# Patient Record
Sex: MALE
[De-identification: names, ages, dates, MRNs, and addresses within clinical notes are randomized per-mention and may not be internally consistent; named-entity substitution may affect disease eponyms.]

---

## 2021-06-17 ENCOUNTER — TRANSCRIPTION ENCOUNTER (OUTPATIENT)
Age: 56
End: 2021-06-17

## 2022-03-09 ENCOUNTER — NON-APPOINTMENT (OUTPATIENT)
Age: 57
End: 2022-03-09

## 2022-03-09 ENCOUNTER — APPOINTMENT (OUTPATIENT)
Dept: ORTHOPEDIC SURGERY | Facility: CLINIC | Age: 57
End: 2022-03-09
Payer: COMMERCIAL

## 2022-03-09 VITALS
HEIGHT: 72 IN | DIASTOLIC BLOOD PRESSURE: 86 MMHG | BODY MASS INDEX: 31.42 KG/M2 | WEIGHT: 232 LBS | HEART RATE: 76 BPM | SYSTOLIC BLOOD PRESSURE: 146 MMHG

## 2022-03-09 PROBLEM — Z00.00 ENCOUNTER FOR PREVENTIVE HEALTH EXAMINATION: Status: ACTIVE | Noted: 2022-03-09

## 2022-03-09 PROCEDURE — 99204 OFFICE O/P NEW MOD 45 MIN: CPT

## 2022-03-09 PROCEDURE — 73140 X-RAY EXAM OF FINGER(S): CPT

## 2022-03-18 NOTE — HISTORY OF PRESENT ILLNESS
[FreeTextEntry1] : 56 year male presents for evaluation of left middle finger swelling. He reports a history of an injury to the left hand about 2 years ago. He notes a fishing hook caught into the left hand at the level of 5th MCP joint. He reports healing well from the puncture wound with no obvious sign of infection at that time. He notes about a month later, he developed swelling of the left ring finger, in which he was evaluated with xray and MRI revealing no acute sign of infection or cause for the swelling. He then notes about one month later, the swelling of the RF resolved and he developed extensive generalized swelling of the left MF, most severe at the level of the PIP joint. He denies further treatment or evaluation. He reports no resolution with NSAIDs. He reports mild stiffness of the finger, with progressive inability to make a fist. He presents for other options for treatment.

## 2022-03-18 NOTE — ASSESSMENT
[FreeTextEntry1] : 56M with chronic swelling stiffness and pain of the left middle finger PIP joint following an infection.  I recommend MRI to evaluate for osteomyelitis of the proximal phalanx.  f/u to review the results of the MRI.

## 2022-03-18 NOTE — PHYSICAL EXAM
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

## 2022-03-21 ENCOUNTER — OUTPATIENT (OUTPATIENT)
Dept: OUTPATIENT SERVICES | Facility: HOSPITAL | Age: 57
LOS: 1 days | End: 2022-03-21
Payer: COMMERCIAL

## 2022-03-21 ENCOUNTER — APPOINTMENT (OUTPATIENT)
Dept: MRI IMAGING | Facility: CLINIC | Age: 57
End: 2022-03-21
Payer: COMMERCIAL

## 2022-03-21 DIAGNOSIS — M79.645 PAIN IN LEFT FINGER(S): ICD-10-CM

## 2022-03-21 DIAGNOSIS — M79.89 OTHER SPECIFIED SOFT TISSUE DISORDERS: ICD-10-CM

## 2022-03-21 PROCEDURE — 73218 MRI UPPER EXTREMITY W/O DYE: CPT | Mod: 26,LT

## 2022-03-21 PROCEDURE — 73218 MRI UPPER EXTREMITY W/O DYE: CPT

## 2022-03-29 ENCOUNTER — NON-APPOINTMENT (OUTPATIENT)
Age: 57
End: 2022-03-29

## 2022-03-29 ENCOUNTER — APPOINTMENT (OUTPATIENT)
Dept: ORTHOPEDIC SURGERY | Facility: CLINIC | Age: 57
End: 2022-03-29
Payer: COMMERCIAL

## 2022-03-29 DIAGNOSIS — M79.89 OTHER SPECIFIED SOFT TISSUE DISORDERS: ICD-10-CM

## 2022-03-29 DIAGNOSIS — M79.645 PAIN IN LEFT FINGER(S): ICD-10-CM

## 2022-03-29 PROCEDURE — 99213 OFFICE O/P EST LOW 20 MIN: CPT

## 2022-03-29 NOTE — ASSESSMENT
[FreeTextEntry1] : 56M with chronic swelling stiffness and pain of the left middle finger PIP joint following an infection.  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  I recommend continued observation/activity as tolerated.  f/u on an as needed basis.

## 2022-03-29 NOTE — HISTORY OF PRESENT ILLNESS
[FreeTextEntry1] : 56 year male presents for evaluation of left middle finger swelling. He reports a history of an injury to the left hand about 2 years ago. He notes a fishing hook caught into the left hand at the level of 5th MCP joint. He reports healing well from the puncture wound with no obvious sign of infection at that time. He notes about a month later, he developed swelling of the left ring finger, in which he was evaluated with xray and MRI revealing no acute sign of infection or cause for the swelling. He then notes about one month later, the swelling of the RF resolved and he developed extensive generalized swelling of the left MF, most severe at the level of the PIP joint. He denies further treatment or evaluation. He reports no resolution with NSAIDs. He reports mild stiffness of the finger, with progressive inability to make a fist. He presents for other options for treatment. \par \par 03/29/2022: Patient presents for reevaluation of his left middle finger swelling.  He reports no change in his symptoms with continued swelling localized to the PIP joint.  He reports very mild pain at this time along with continued mild stiffness.  He presents for MRI review and further options for treatment.

## 2022-03-29 NOTE — PHYSICAL EXAM
[Normal] : Oriented to person, place, and time, insight and judgement were intact and the affect was normal [de-identified] : Left middle finger: \par skin intact, moderate swelling of the PIP joint, mild erythema, no ecchymosis\par TTP of the PIPJ, PIPJ ROM limited 2/2 stiffness\par normal finger cascade\par NVI distally [de-identified] : MRI negative for osteomyelitis, demonstrates erosive arthritis of the PIP joint

## 2022-06-06 ENCOUNTER — NON-APPOINTMENT (OUTPATIENT)
Age: 57
End: 2022-06-06

## 2023-03-28 ENCOUNTER — TRANSCRIPTION ENCOUNTER (OUTPATIENT)
Age: 58
End: 2023-03-28

## 2023-06-29 ENCOUNTER — NON-APPOINTMENT (OUTPATIENT)
Age: 58
End: 2023-06-29